# Patient Record
Sex: FEMALE | Race: WHITE | NOT HISPANIC OR LATINO | URBAN - METROPOLITAN AREA
[De-identification: names, ages, dates, MRNs, and addresses within clinical notes are randomized per-mention and may not be internally consistent; named-entity substitution may affect disease eponyms.]

---

## 2017-01-26 ENCOUNTER — COMMUNICATION - HEALTHEAST (OUTPATIENT)
Dept: FAMILY MEDICINE | Facility: CLINIC | Age: 57
End: 2017-01-26

## 2017-01-26 DIAGNOSIS — G43.909 MIGRAINE: ICD-10-CM

## 2017-04-28 ENCOUNTER — COMMUNICATION - HEALTHEAST (OUTPATIENT)
Dept: FAMILY MEDICINE | Facility: CLINIC | Age: 57
End: 2017-04-28

## 2017-04-28 ENCOUNTER — OFFICE VISIT - HEALTHEAST (OUTPATIENT)
Dept: FAMILY MEDICINE | Facility: CLINIC | Age: 57
End: 2017-04-28

## 2017-04-28 DIAGNOSIS — J02.9 SORE THROAT: ICD-10-CM

## 2017-04-28 DIAGNOSIS — E78.00 HYPERCHOLESTEROLEMIA: ICD-10-CM

## 2017-04-28 DIAGNOSIS — Z00.00 ROUTINE GENERAL MEDICAL EXAMINATION AT A HEALTH CARE FACILITY: ICD-10-CM

## 2017-04-28 DIAGNOSIS — I10 HTN (HYPERTENSION): ICD-10-CM

## 2017-04-28 DIAGNOSIS — I10 ESSENTIAL HYPERTENSION: ICD-10-CM

## 2017-04-28 LAB
ALT SERPL W P-5'-P-CCNC: 18 U/L (ref 0–45)
CHOLEST SERPL-MCNC: 191 MG/DL
FASTING STATUS PATIENT QL REPORTED: YES
HDLC SERPL-MCNC: 55 MG/DL
LDLC SERPL CALC-MCNC: 120 MG/DL
TRIGL SERPL-MCNC: 78 MG/DL

## 2017-04-28 ASSESSMENT — MIFFLIN-ST. JEOR: SCORE: 1302.3

## 2018-04-26 ENCOUNTER — COMMUNICATION - HEALTHEAST (OUTPATIENT)
Dept: FAMILY MEDICINE | Facility: CLINIC | Age: 58
End: 2018-04-26

## 2018-04-26 DIAGNOSIS — I10 HTN (HYPERTENSION): ICD-10-CM

## 2018-07-27 ENCOUNTER — COMMUNICATION - HEALTHEAST (OUTPATIENT)
Dept: FAMILY MEDICINE | Facility: CLINIC | Age: 58
End: 2018-07-27

## 2018-07-27 DIAGNOSIS — I10 HTN (HYPERTENSION): ICD-10-CM

## 2018-08-12 ENCOUNTER — COMMUNICATION - HEALTHEAST (OUTPATIENT)
Dept: FAMILY MEDICINE | Facility: CLINIC | Age: 58
End: 2018-08-12

## 2018-08-12 DIAGNOSIS — I10 HTN (HYPERTENSION): ICD-10-CM

## 2018-08-23 ENCOUNTER — COMMUNICATION - HEALTHEAST (OUTPATIENT)
Dept: FAMILY MEDICINE | Facility: CLINIC | Age: 58
End: 2018-08-23

## 2018-08-23 DIAGNOSIS — I10 HTN (HYPERTENSION): ICD-10-CM

## 2018-08-29 ENCOUNTER — RECORDS - HEALTHEAST (OUTPATIENT)
Dept: MAMMOGRAPHY | Facility: CLINIC | Age: 58
End: 2018-08-29

## 2018-08-29 ENCOUNTER — OFFICE VISIT - HEALTHEAST (OUTPATIENT)
Dept: FAMILY MEDICINE | Facility: CLINIC | Age: 58
End: 2018-08-29

## 2018-08-29 DIAGNOSIS — Z00.00 ROUTINE GENERAL MEDICAL EXAMINATION AT A HEALTH CARE FACILITY: ICD-10-CM

## 2018-08-29 DIAGNOSIS — F17.200 TOBACCO USE DISORDER: ICD-10-CM

## 2018-08-29 DIAGNOSIS — Z00.00 HEALTHCARE MAINTENANCE: ICD-10-CM

## 2018-08-29 DIAGNOSIS — G43.909 MIGRAINE: ICD-10-CM

## 2018-08-29 DIAGNOSIS — Z12.31 ENCOUNTER FOR SCREENING MAMMOGRAM FOR MALIGNANT NEOPLASM OF BREAST: ICD-10-CM

## 2018-08-29 DIAGNOSIS — I10 ESSENTIAL HYPERTENSION: ICD-10-CM

## 2018-08-29 DIAGNOSIS — E78.00 HYPERCHOLESTEROLEMIA: ICD-10-CM

## 2018-08-29 LAB
ALT SERPL W P-5'-P-CCNC: 18 U/L (ref 0–45)
ANION GAP SERPL CALCULATED.3IONS-SCNC: 10 MMOL/L (ref 5–18)
BUN SERPL-MCNC: 17 MG/DL (ref 8–22)
CALCIUM SERPL-MCNC: 9.6 MG/DL (ref 8.5–10.5)
CHLORIDE BLD-SCNC: 108 MMOL/L (ref 98–107)
CHOLEST SERPL-MCNC: 235 MG/DL
CO2 SERPL-SCNC: 25 MMOL/L (ref 22–31)
CREAT SERPL-MCNC: 0.68 MG/DL (ref 0.6–1.1)
FASTING STATUS PATIENT QL REPORTED: YES
GFR SERPL CREATININE-BSD FRML MDRD: >60 ML/MIN/1.73M2
GLUCOSE BLD-MCNC: 91 MG/DL (ref 70–125)
HDLC SERPL-MCNC: 59 MG/DL
LDLC SERPL CALC-MCNC: 161 MG/DL
POTASSIUM BLD-SCNC: 4.1 MMOL/L (ref 3.5–5)
SODIUM SERPL-SCNC: 143 MMOL/L (ref 136–145)
TRIGL SERPL-MCNC: 77 MG/DL

## 2018-08-29 ASSESSMENT — MIFFLIN-ST. JEOR: SCORE: 1281.89

## 2018-08-29 NOTE — ASSESSMENT & PLAN NOTE
Healthcare maintenance assessment  Immunization-up-to-date, flu shot when available  Cancer [id number]-date colon cancer screening and Pap smear, mammography due which was ordered today  Vascular-as above, encouraged to quit smoking  Other -none

## 2018-08-29 NOTE — ASSESSMENT & PLAN NOTE
Hypertension is borderline controlled  BP Readings from Last 3 Encounters:   08/29/18 142/74   04/28/17 136/72   12/10/15 100/70     Comment: Patient's blood pressure numbers, every 2 weeks in the pharmacy, are better.  Current antihypertensives propranolol 40 mg twice daily  Plan: Continue this medication, start checking blood pressures occasionally as nurse only visits in our clinic  Follow-up: Depending on blood pressure, check BMP

## 2018-08-30 ENCOUNTER — COMMUNICATION - HEALTHEAST (OUTPATIENT)
Dept: FAMILY MEDICINE | Facility: CLINIC | Age: 58
End: 2018-08-30

## 2018-08-31 ENCOUNTER — AMBULATORY - HEALTHEAST (OUTPATIENT)
Dept: FAMILY MEDICINE | Facility: CLINIC | Age: 58
End: 2018-08-31

## 2018-08-31 DIAGNOSIS — E78.5 HYPERLIPIDEMIA LDL GOAL <100: ICD-10-CM

## 2018-10-23 ENCOUNTER — RECORDS - HEALTHEAST (OUTPATIENT)
Dept: ADMINISTRATIVE | Facility: OTHER | Age: 58
End: 2018-10-23

## 2019-05-29 ENCOUNTER — COMMUNICATION - HEALTHEAST (OUTPATIENT)
Dept: FAMILY MEDICINE | Facility: CLINIC | Age: 59
End: 2019-05-29

## 2019-06-13 ENCOUNTER — COMMUNICATION - HEALTHEAST (OUTPATIENT)
Dept: FAMILY MEDICINE | Facility: CLINIC | Age: 59
End: 2019-06-13

## 2019-06-13 DIAGNOSIS — I10 ESSENTIAL HYPERTENSION: ICD-10-CM

## 2019-09-15 ENCOUNTER — COMMUNICATION - HEALTHEAST (OUTPATIENT)
Dept: FAMILY MEDICINE | Facility: CLINIC | Age: 59
End: 2019-09-15

## 2019-09-15 DIAGNOSIS — I10 ESSENTIAL HYPERTENSION: ICD-10-CM

## 2019-11-16 ENCOUNTER — COMMUNICATION - HEALTHEAST (OUTPATIENT)
Dept: FAMILY MEDICINE | Facility: CLINIC | Age: 59
End: 2019-11-16

## 2019-11-16 DIAGNOSIS — I10 ESSENTIAL HYPERTENSION: ICD-10-CM

## 2020-01-09 ENCOUNTER — COMMUNICATION - HEALTHEAST (OUTPATIENT)
Dept: FAMILY MEDICINE | Facility: CLINIC | Age: 60
End: 2020-01-09

## 2020-01-09 DIAGNOSIS — I10 ESSENTIAL HYPERTENSION: ICD-10-CM

## 2020-01-29 ENCOUNTER — OFFICE VISIT - HEALTHEAST (OUTPATIENT)
Dept: FAMILY MEDICINE | Facility: CLINIC | Age: 60
End: 2020-01-29

## 2020-01-29 DIAGNOSIS — I10 ESSENTIAL HYPERTENSION: ICD-10-CM

## 2020-01-29 DIAGNOSIS — E78.00 HYPERCHOLESTEROLEMIA: ICD-10-CM

## 2020-01-29 DIAGNOSIS — F41.9 ANXIETY: ICD-10-CM

## 2020-01-29 DIAGNOSIS — Z00.00 HEALTHCARE MAINTENANCE: ICD-10-CM

## 2020-01-29 DIAGNOSIS — G43.909 MIGRAINE WITHOUT STATUS MIGRAINOSUS, NOT INTRACTABLE, UNSPECIFIED MIGRAINE TYPE: ICD-10-CM

## 2020-01-29 LAB
ALT SERPL W P-5'-P-CCNC: 32 U/L (ref 0–45)
ANION GAP SERPL CALCULATED.3IONS-SCNC: 12 MMOL/L (ref 5–18)
BUN SERPL-MCNC: 15 MG/DL (ref 8–22)
CALCIUM SERPL-MCNC: 9.5 MG/DL (ref 8.5–10.5)
CHLORIDE BLD-SCNC: 105 MMOL/L (ref 98–107)
CHOLEST SERPL-MCNC: 170 MG/DL
CO2 SERPL-SCNC: 24 MMOL/L (ref 22–31)
CREAT SERPL-MCNC: 0.77 MG/DL (ref 0.6–1.1)
FASTING STATUS PATIENT QL REPORTED: YES
GFR SERPL CREATININE-BSD FRML MDRD: >60 ML/MIN/1.73M2
GLUCOSE BLD-MCNC: 115 MG/DL (ref 70–125)
HDLC SERPL-MCNC: 60 MG/DL
LDLC SERPL CALC-MCNC: 89 MG/DL
POTASSIUM BLD-SCNC: 4.5 MMOL/L (ref 3.5–5)
SODIUM SERPL-SCNC: 141 MMOL/L (ref 136–145)
TRIGL SERPL-MCNC: 106 MG/DL

## 2020-01-29 RX ORDER — LORAZEPAM 0.5 MG/1
TABLET ORAL
Qty: 20 TABLET | Refills: 0 | Status: SHIPPED | OUTPATIENT
Start: 2020-01-29

## 2020-01-29 ASSESSMENT — MIFFLIN-ST. JEOR: SCORE: 1330.65

## 2020-01-29 NOTE — ASSESSMENT & PLAN NOTE
Patient quit smoking which drastically drops predicted risk of cardiac ischemia, now 3% based on today's numbers.  Patient with strong family history of heart disease-wishes to continue for now  Check lipid and ALT

## 2020-01-29 NOTE — ASSESSMENT & PLAN NOTE
Blood pressure continues to be well controlled on propranolol 40 mg twice daily alone.  Dual use of this medication, with migraine prophylaxis.  Previously on lisinopril which she no longer requires.  Continues to check blood pressures regularly, generally under good control.  No changes, check BMP.

## 2020-01-29 NOTE — ASSESSMENT & PLAN NOTE
Healthcare maintenance assessment  Immunization-up-to-date  Cancer screening-mammogram every 2 years, due in this next year, Pap smear every 5 if normal, due December, wished to wait on this.  Vascular-see discussion of hyperlipidemia  Other-encourage exercise

## 2020-01-30 ENCOUNTER — COMMUNICATION - HEALTHEAST (OUTPATIENT)
Dept: FAMILY MEDICINE | Facility: CLINIC | Age: 60
End: 2020-01-30

## 2020-08-08 ENCOUNTER — COMMUNICATION - HEALTHEAST (OUTPATIENT)
Dept: FAMILY MEDICINE | Facility: CLINIC | Age: 60
End: 2020-08-08

## 2020-08-08 DIAGNOSIS — I10 ESSENTIAL HYPERTENSION: ICD-10-CM

## 2020-11-02 ENCOUNTER — COMMUNICATION - HEALTHEAST (OUTPATIENT)
Dept: FAMILY MEDICINE | Facility: CLINIC | Age: 60
End: 2020-11-02

## 2020-11-02 DIAGNOSIS — I10 ESSENTIAL HYPERTENSION: ICD-10-CM

## 2021-01-09 ENCOUNTER — COMMUNICATION - HEALTHEAST (OUTPATIENT)
Dept: FAMILY MEDICINE | Facility: CLINIC | Age: 61
End: 2021-01-09

## 2021-01-09 ENCOUNTER — COMMUNICATION - HEALTHEAST (OUTPATIENT)
Dept: SCHEDULING | Facility: CLINIC | Age: 61
End: 2021-01-09

## 2021-01-09 DIAGNOSIS — I10 ESSENTIAL HYPERTENSION: ICD-10-CM

## 2021-02-09 ENCOUNTER — COMMUNICATION - HEALTHEAST (OUTPATIENT)
Dept: FAMILY MEDICINE | Facility: CLINIC | Age: 61
End: 2021-02-09

## 2021-02-09 DIAGNOSIS — I10 ESSENTIAL HYPERTENSION: ICD-10-CM

## 2021-02-13 ENCOUNTER — COMMUNICATION - HEALTHEAST (OUTPATIENT)
Dept: FAMILY MEDICINE | Facility: CLINIC | Age: 61
End: 2021-02-13

## 2021-02-13 DIAGNOSIS — I10 ESSENTIAL HYPERTENSION: ICD-10-CM

## 2021-02-16 RX ORDER — PROPRANOLOL HYDROCHLORIDE 40 MG/1
40 TABLET ORAL 3 TIMES DAILY
Qty: 90 TABLET | Refills: 5 | Status: SHIPPED | OUTPATIENT
Start: 2021-02-16 | End: 2021-08-17

## 2021-05-18 ENCOUNTER — COMMUNICATION - HEALTHEAST (OUTPATIENT)
Dept: FAMILY MEDICINE | Facility: CLINIC | Age: 61
End: 2021-05-18

## 2021-05-18 DIAGNOSIS — I10 ESSENTIAL HYPERTENSION: ICD-10-CM

## 2021-05-19 RX ORDER — ATORVASTATIN CALCIUM 10 MG/1
TABLET, FILM COATED ORAL
Qty: 90 TABLET | Refills: 0 | Status: SHIPPED | OUTPATIENT
Start: 2021-05-19 | End: 2021-08-13

## 2021-05-29 NOTE — TELEPHONE ENCOUNTER
Called patient to schedule appt with PCP for BP check, patient declined at the moment, will have to check her schedule and will call back to schedule appt. Postponing out 1 week and will try again if appt has not been made.

## 2021-05-29 NOTE — TELEPHONE ENCOUNTER
Refill Approved    Rx renewed per Medication Renewal Policy. Medication was last renewed on 8/29/18.    Sharmaine Romero, [facility] Connection Triage/Med Refill 6/13/2019     Requested Prescriptions   Pending Prescriptions Disp Refills     propranolol (INDERAL) 40 MG tablet [Pharmacy Med Name: Propranolol HCl Oral Tablet 40 MG] 90 tablet 2     Sig: TAKE 1 TABLET (40 MG TOTAL) BY MOUTH 3 (THREE) TIMES A DAY       Beta-Blockers Refill Protocol Passed - 6/13/2019  7:00 AM        Passed - PCP or prescribing provider visit in past 12 months or next 3 months     Last office visit with prescriber/PCP: Visit date not found OR same dept: Visit date not found OR same specialty: Visit date not found  Last physical: 8/29/2018 Last MTM visit: Visit date not found   Next visit within 3 mo: Visit date not found  Next physical within 3 mo: Visit date not found  Prescriber OR PCP: Wale Lima MD  Last diagnosis associated with med order: There are no diagnoses linked to this encounter.  If protocol passes may refill for 12 months if within 3 months of last provider visit (or a total of 15 months).             Passed - Blood pressure filed in past 12 months     BP Readings from Last 1 Encounters:   08/29/18 142/74

## 2021-05-30 VITALS — HEIGHT: 65 IN | WEIGHT: 163 LBS | BODY MASS INDEX: 27.16 KG/M2

## 2021-06-01 NOTE — TELEPHONE ENCOUNTER
RN cannot approve Refill Request    RN can NOT refill this medication PCP messaged that patient is overdue for Office Visit. Last office visit: Visit date not found Last Physical: 8/29/2018 Last MTM visit: Visit date not found Last visit same specialty: Visit date not found.  Next visit within 3 mo: Visit date not found  Next physical within 3 mo: Visit date not found      Vinicius Hatch, [facility] Connection Triage/Med Refill 9/15/2019    Requested Prescriptions   Pending Prescriptions Disp Refills     atorvastatin (LIPITOR) 10 MG tablet [Pharmacy Med Name: Atorvastatin Calcium Oral Tablet 10 MG] 30 tablet 10     Sig: Take 1 tablet (10 mg total) by mouth daily       Statins Refill Protocol (Hmg CoA Reductase Inhibitors) Failed - 9/15/2019  7:00 AM        Failed - PCP or prescribing provider visit in past 12 months      Last office visit with prescriber/PCP: Visit date not found OR same dept: Visit date not found OR same specialty: Visit date not found  Last physical: 8/29/2018 Last MTM visit: Visit date not found   Next visit within 3 mo: Visit date not found  Next physical within 3 mo: Visit date not found  Prescriber OR PCP: Wale Lima MD  Last diagnosis associated with med order: 1. Essential hypertension  - propranolol (INDERAL) 40 MG tablet [Pharmacy Med Name: Propranolol HCl Oral Tablet 40 MG]; TAKE 1 TABLET (40 MG TOTAL) BY MOUTH 3 (THREE) TIMES A DAY  Dispense: 90 tablet; Refill: 1    If protocol passes may refill for 12 months if within 3 months of last provider visit (or a total of 15 months).             propranolol (INDERAL) 40 MG tablet [Pharmacy Med Name: Propranolol HCl Oral Tablet 40 MG] 90 tablet 1     Sig: TAKE 1 TABLET (40 MG TOTAL) BY MOUTH 3 (THREE) TIMES A DAY       Beta-Blockers Refill Protocol Failed - 9/15/2019  7:00 AM        Failed - PCP or prescribing provider visit in past 12 months or next 3 months     Last office visit with prescriber/PCP: Visit date not found OR same dept: Visit  date not found OR same specialty: Visit date not found  Last physical: 8/29/2018 Last MTM visit: Visit date not found   Next visit within 3 mo: Visit date not found  Next physical within 3 mo: Visit date not found  Prescriber OR PCP: Wale Lima MD  Last diagnosis associated with med order: 1. Essential hypertension  - propranolol (INDERAL) 40 MG tablet [Pharmacy Med Name: Propranolol HCl Oral Tablet 40 MG]; TAKE 1 TABLET (40 MG TOTAL) BY MOUTH 3 (THREE) TIMES A DAY  Dispense: 90 tablet; Refill: 1    If protocol passes may refill for 12 months if within 3 months of last provider visit (or a total of 15 months).             Failed - Blood pressure filed in past 12 months     BP Readings from Last 1 Encounters:   08/29/18 142/74

## 2021-06-02 VITALS — WEIGHT: 158.5 LBS | BODY MASS INDEX: 26.41 KG/M2 | HEIGHT: 65 IN

## 2021-06-03 NOTE — TELEPHONE ENCOUNTER
RN cannot approve Refill Request    RN can NOT refill this medication Protocol failed and NO refill given.         Sharmaine Romero, [facility] Triage/Med Refill 11/17/2019    Requested Prescriptions   Pending Prescriptions Disp Refills     propranolol (INDERAL) 40 MG tablet [Pharmacy Med Name: Propranolol HCl Oral Tablet 40 MG] 90 tablet 0     Sig: TAKE 1 TABLET (40 MG TOTAL) BY MOUTH 3 (THREE) TIMES A DAY       Beta-Blockers Refill Protocol Failed - 11/16/2019  7:00 AM        Failed - PCP or prescribing provider visit in past 12 months or next 3 months     Last office visit with prescriber/PCP: Visit date not found OR same dept: Visit date not found OR same specialty: Visit date not found  Last physical: 8/29/2018 Last MTM visit: Visit date not found   Next visit within 3 mo: Visit date not found  Next physical within 3 mo: Visit date not found  Prescriber OR PCP: Wale Lima MD  Last diagnosis associated with med order: 1. Essential hypertension  - propranolol (INDERAL) 40 MG tablet [Pharmacy Med Name: Propranolol HCl Oral Tablet 40 MG]; TAKE 1 TABLET (40 MG TOTAL) BY MOUTH 3 (THREE) TIMES A DAY   Dispense: 90 tablet; Refill: 0    If protocol passes may refill for 12 months if within 3 months of last provider visit (or a total of 15 months).             Failed - Blood pressure filed in past 12 months     BP Readings from Last 1 Encounters:   08/29/18 142/74

## 2021-06-04 VITALS
RESPIRATION RATE: 16 BRPM | SYSTOLIC BLOOD PRESSURE: 136 MMHG | TEMPERATURE: 98 F | HEIGHT: 64 IN | HEART RATE: 65 BPM | WEIGHT: 171 LBS | DIASTOLIC BLOOD PRESSURE: 87 MMHG | BODY MASS INDEX: 29.19 KG/M2

## 2021-06-05 NOTE — TELEPHONE ENCOUNTER
Refill Request  Did you contact pharmacy: No  Medication name:   Requested Prescriptions     Pending Prescriptions Disp Refills     propranolol (INDERAL) 40 MG tablet 90 tablet 0     Sig: Take 1 tablet (40 mg total) by mouth 3 (three) times a day.

## 2021-06-05 NOTE — TELEPHONE ENCOUNTER
Refill NOT Given  Refill NOT Given> 15 months since last office visit  Refill given per Policy, patient informed they are overdue for Office Visit   OV 8/29/18    Alissa Rowan, [facility] Triage/Med Refill 1/10/2020    Requested Prescriptions   Pending Prescriptions Disp Refills     propranolol (INDERAL) 40 MG tablet 90 tablet 0     Sig: Take 1 tablet (40 mg total) by mouth 3 (three) times a day.       Beta-Blockers Refill Protocol Failed - 1/9/2020 10:39 AM        Failed - PCP or prescribing provider visit in past 12 months or next 3 months     Last office visit with prescriber/PCP: Visit date not found OR same dept: Visit date not found OR same specialty: Visit date not found  Last physical: 8/29/2018 Last MTM visit: Visit date not found   Next visit within 3 mo: Visit date not found  Next physical within 3 mo: Visit date not found  Prescriber OR PCP: Wale Lima MD  Last diagnosis associated with med order: 1. Essential hypertension  - propranolol (INDERAL) 40 MG tablet; Take 1 tablet (40 mg total) by mouth 3 (three) times a day.  Dispense: 90 tablet; Refill: 0    If protocol passes may refill for 12 months if within 3 months of last provider visit (or a total of 15 months).             Failed - Blood pressure filed in past 12 months     BP Readings from Last 1 Encounters:   08/29/18 142/74

## 2021-06-05 NOTE — PROGRESS NOTES
Adult Female Physical  A/P    Problem List Items Addressed This Visit        Unprioritized    Hypercholesterolemia     Patient quit smoking which drastically drops predicted risk of cardiac ischemia, now 3% based on today's numbers.  Patient with strong family history of heart disease-wishes to continue for now  Check lipid and ALT         Relevant Orders    ALT (SGPT)    Lipid Cascade    Migraine Headache     Well prophylaxed with propranolol         Essential Hypertension - Primary     Blood pressure continues to be well controlled on propranolol 40 mg twice daily alone.  Dual use of this medication, with migraine prophylaxis.  Previously on lisinopril which she no longer requires.  Continues to check blood pressures regularly, generally under good control.  No changes, check BMP.         Relevant Orders    Basic Metabolic Panel    Healthcare maintenance     Healthcare maintenance assessment  Immunization-up-to-date  Cancer screening-mammogram every 2 years, due in this next year, Pap smear every 5 if normal, due December, wished to wait on this.  Vascular-see discussion of hyperlipidemia  Other-encourage exercise             Anxiety     New Lorazepam, refills through the pharmacy, contact us directly or request in person         Relevant Medications    LORazepam (ATIVAN) 0.5 MG tablet              HPI  Current Concerns/ Questions  Oly is a 59-year-old with history of essential hypertension, hypercholesterolemia, nicotine dependence, and migraine headaches.  She currently takes 10 mg of atorvastatin, propranolol 40 mg 3 times a day for a combination of blood pressure control and migraine prophylaxis.  She is up-to-date on colonoscopy having had one in 2014.  Up-to-date on Pap smear having done 1 in December 2015 which was normal with normal HPV.  She will be due for this in December of this year.  10-year risk calculated in August 2018 for MI was 11%.  Recommended statin medication on this basis.  Would have  gone less than 10% which should be able to quit smoking.  Mammogram normal 8/29/2018.  Reviewed colonoscopy from 2014, single hyperplastic polyp, follow-up 10 years.    Discussing with patient today, no new concerns.  Requesting refill on Lorazepam which she uses very infrequently for anxiety.    Reports happily that she quit smoking, skin gained significant amount of weight with this.    Checks blood pressures regularly, generally in the 120s, 130s over 70s and 80s  PFSH:  Current medications reviewed as follows:  Current Outpatient Medications on File Prior to Visit   Medication Sig     atorvastatin (LIPITOR) 10 MG tablet Take 1 tablet (10 mg total) by mouth daily     propranolol (INDERAL) 40 MG tablet Take 1 tablet (40 mg total) by mouth 3 (three) times a day.     [DISCONTINUED] LORazepam (ATIVAN) 0.5 MG tablet 1-2 tabs po q 6 hours prn     No current facility-administered medications on file prior to visit.      Patient Active Problem List   Diagnosis     Hypercholesterolemia     Migraine Headache     Essential Hypertension     Healthcare maintenance     Anxiety     No past medical history on file.  No past surgical history on file.  Family History   Problem Relation Age of Onset     Stroke Mother 67     Rheumatic fever Father 47     Hypertension Brother      Psoriasis Brother      Social History     Tobacco Use   Smoking Status Current Every Day Smoker     Packs/day: 0.50     Types: Cigarettes   Smokeless Tobacco Never Used       Social History     Social History Narrative     2018, working, family lives in [facility]     Immunization History   Administered Date(s) Administered     DT (pediatric) 02/04/2005     Influenza, inj, historic,unspecified 10/22/2013, 10/21/2019     Pneumo Polysac 23-V 10/09/2013     Td,adult,historic,unspecified 04/27/2009     Tdap 04/27/2009, 10/21/2019     ZOSTER, RECOMBINANT, IM 10/21/2019, 12/29/2019     Body mass index is 29.35 kg/m .    ROS  Full 10 system review  "including constitutional, respiratory, cardiac, gi, urinary, rheumatologic, neurologic, reproductive, dermatologic psychiatric is  performed  and is otherwise negative       Objective  Physical Exam  Vitals:    01/29/20 0808 01/29/20 0843   BP: 150/84 136/87   Patient Site: Right Arm Right Arm   Patient Position: Sitting Sitting   Cuff Size: Adult Large Adult Large   Pulse: 65    Resp: 16    Temp: 98  F (36.7  C)    TempSrc: Oral    Weight: 171 lb (77.6 kg)    Height: 5' 4\" (1.626 m)      Pleasant, gained some weight  Gen- alert and oriented x3, no acute distress  HEENT- Normocephalic atraumatic   pupils equal and reactive, EOMI.    TMs visualized and normal, ear canals normal.    Mouth moist with normal mucosa no ulceration, dentition normal or in good repair.  Neck- supple, no adenopathy or thyromegaly  Chest- Normal chest wall apperance, normal inspiration and expiration.  Clear to asculation.    CV- Regular rate and rhythm, normal tones, no murmus, gallops or rubs.  Abd-  Soft, nodistended, nontender.  Normal bowel sounds, no mass or organ enlargement.    Ext- Atraumatic,  No synovial thickening. Good perfusion, no edema. Periph pulses detected  Skin- warm and dry, no rash  Neuro- Cranial nerves grossly intact.  Normal gait, normal strength. Coordination intact.    Diagnostics  Pending    "

## 2021-06-05 NOTE — TELEPHONE ENCOUNTER
Refill NOT Given  Refill NOT Given> 15 months since last office visit  Refill given per Policy, patient informed they are overdue for Office Visit   OV 8/29/18    Alissa Rowan, [facility] Triage/Med Refill 1/10/2020    Requested Prescriptions   Pending Prescriptions Disp Refills     propranolol (INDERAL) 40 MG tablet [Pharmacy Med Name: Propranolol HCl Oral Tablet 40 MG] 90 tablet 0     Sig: TAKE 1 TABLET (40 MG TOTAL) BY MOUTH 3 (THREE) TIMES A DAY       Beta-Blockers Refill Protocol Failed - 1/9/2020  6:13 PM        Failed - PCP or prescribing provider visit in past 12 months or next 3 months     Last office visit with prescriber/PCP: Visit date not found OR same dept: Visit date not found OR same specialty: Visit date not found  Last physical: Visit date not found Last MTM visit: Visit date not found   Next visit within 3 mo: Visit date not found  Next physical within 3 mo: Visit date not found  Prescriber OR PCP: Luis Miguel Guadarrama MD  Last diagnosis associated with med order: 1. Essential hypertension  - propranolol (INDERAL) 40 MG tablet [Pharmacy Med Name: Propranolol HCl Oral Tablet 40 MG]; TAKE 1 TABLET (40 MG TOTAL) BY MOUTH 3 (THREE) TIMES A DAY  Dispense: 90 tablet; Refill: 0    If protocol passes may refill for 12 months if within 3 months of last provider visit (or a total of 15 months).             Failed - Blood pressure filed in past 12 months     BP Readings from Last 1 Encounters:   08/29/18 142/74

## 2021-06-10 NOTE — PROGRESS NOTES
Adult Female Physical  A/P  1. Routine general medical examination at a health care facility  Due for mammogram, declines this at this time.  Up-to-date, Pap smear, due in 2020  Colonoscopy done in 2014  2. Sore throat  Viral URI, reassured, symptomatic treatment  - Rapid Strep A Screen-Throat  - Group A Strep, RNA Direct Detection, Throat    3. Essential hypertension  Well-controlled on propranolol.  Uses this for blood pressure and migraine prophylaxis and it is effective.  Only one major migraine in 2 or 3 minor migraines every year  - Lipid Cascade  - Basic Metabolic Panel  - ALT (SGPT)    4. Hypercholesterolemia  Recheck labs.  - Lipid Cascade  - ALT (SGPT)  5.  Nicotine dependence.  Patient not contemplating quitting at this time.   has terminal condition, under a lot of stress.  Smoking a total of a pack a week      HPI  Current Concerns/ Questions  Uri x 1 day- tested for strep    Taking care of - still with stress  PFSH:  Current medications reviewed as follows:  Current Outpatient Prescriptions on File Prior to Visit   Medication Sig     LORazepam (ATIVAN) 0.5 MG tablet 1-2 tabs po q 6 hours prn     propranolol (INDERAL) 40 MG tablet TAKE ONE TABLET BY MOUTH TWICE DAILY      lisinopril (PRINIVIL,ZESTRIL) 10 MG tablet Take 1 tablet (10 mg total) by mouth daily.     propranolol (INDERAL) 20 MG tablet TAKE 2 TABLETS (40 MG TOTAL) BY MOUTH 2 (TWO) TIMES A DAY.     No current facility-administered medications on file prior to visit.      Patient Active Problem List   Diagnosis     Hyperlipidemia     Nicotine Dependence     Migraine Headache     Essential Hypertension     No past medical history on file.  No past surgical history on file.  No family history on file.  History   Smoking Status     Current Every Day Smoker     Packs/day: 0.50     Types: Cigarettes   Smokeless Tobacco     Not on file     Other Habits:  Alcohol/ Drug use?  No  Social History     Social History Narrative     Immunization  "History   Administered Date(s) Administered     DT (pediatric) 02/04/2005     Influenza, inj, historic 10/22/2013     Pneumo Polysac 23-V 10/09/2013     Td, historic 04/27/2009     Tdap 04/27/2009       Body mass index is 27.55 kg/(m^2).    ROS  Full 10 system review including constitutional, respiratory, cardiac, gi, urinary, rheumatologic, neurologic, reproductive, dermatologic psychiatric is  performed (via questionnaire) and is negative      Objective  Physical Exam  Vitals:    04/28/17 1024   BP: 136/72   Patient Site: Right Arm   Patient Position: Sitting   Cuff Size: Adult Regular   Pulse: 60   Resp: 20   Temp: 98.2  F (36.8  C)   TempSrc: Oral   Weight: 163 lb (73.9 kg)   Height: 5' 4.5\" (1.638 m)       Gen- alert and oriented x3, no acute distress  HEENT- Normocephalic atraumatic   pupils equal and reactive, EOMI.    TMs visualized and normal, ear canals normal.    Mouth moist with normal mucosa no ulceration, dentition normal or in good repair.  Neck- supple, no adenopathy or thyromegaly  Chest- Normal chest wall apperance, normal inspiration and expiration.  Clear to asculation.  Breast exam- normal, no dominant masses, skin changes.  No axillary adenopathy  CV- Regular rate and rhythm, normal tones, no murmus, gallops or rubs.  Abd-  Soft, nodistended, nontender.  Normal bowel sounds, no mass or organ enlargement.  Pelvic exam not done after discussion today  Ext- Atraumatic,  No synovial thickening. Good perfusion, no edema. Periph pulses detected  Skin- warm and dry, no rash  Neuro- Cranial nerves grossly intact.  Normal gait, normal strength.  Reflexes symmetric.  Coordination intact.    Diagnostics  Pending  "

## 2021-06-10 NOTE — TELEPHONE ENCOUNTER
Refill Approved    Rx renewed per Medication Renewal Policy. Medication was last renewed on 9/17/19.    Zaina Devlin, [facility] [place] Connection Triage/Med Refill 8/8/2020     Requested Prescriptions   Pending Prescriptions Disp Refills     atorvastatin (LIPITOR) 10 MG tablet [Pharmacy Med Name: Atorvastatin Calcium Oral Tablet 10 MG] 30 tablet 0     Sig: Take 1 tablet (10 mg total) by mouth daily       Statins Refill Protocol (Hmg CoA Reductase Inhibitors) Passed - 8/8/2020  2:00 AM        Passed - PCP or prescribing provider visit in past 12 months      Last office visit with prescriber/PCP: Visit date not found OR same dept: Visit date not found OR same specialty: Visit date not found  Last physical: 1/29/2020 Last MTM visit: Visit date not found   Next visit within 3 mo: Visit date not found  Next physical within 3 mo: Visit date not found  Prescriber OR PCP: Wale Lima MD  Last diagnosis associated with med order: 1. Essential hypertension  - atorvastatin (LIPITOR) 10 MG tablet [Pharmacy Med Name: Atorvastatin Calcium Oral Tablet 10 MG]; Take 1 tablet (10 mg total) by mouth daily  Dispense: 30 tablet; Refill: 0    If protocol passes may refill for 12 months if within 3 months of last provider visit (or a total of 15 months).

## 2021-06-14 NOTE — TELEPHONE ENCOUNTER
Refill Approved    Rx renewed per Medication Renewal Policy. Medication was last renewed on 1/10/2020  OV 1/29/2020  Valeria Allen, [facility] Connection Triage/Med Refill 1/9/2021     Requested Prescriptions   Pending Prescriptions Disp Refills     propranoloL (INDERAL) 40 MG tablet [Pharmacy Med Name: Propranolol HCl Oral Tablet 40 MG] 90 tablet 0     Sig: Take 1 tablet (40 mg total) by mouth 3 (three) times a day       Beta-Blockers Refill Protocol Passed - 1/9/2021  2:00 AM        Passed - PCP or prescribing provider visit in past 12 months or next 3 months     Last office visit with prescriber/PCP: Visit date not found OR same dept: Visit date not found OR same specialty: Visit date not found  Last physical: 1/29/2020 Last MTM visit: Visit date not found   Next visit within 3 mo: Visit date not found  Next physical within 3 mo: Visit date not found  Prescriber OR PCP: Wale Lima MD  Last diagnosis associated with med order: 1. Essential hypertension  - propranoloL (INDERAL) 40 MG tablet [Pharmacy Med Name: Propranolol HCl Oral Tablet 40 MG]; Take 1 tablet (40 mg total) by mouth 3 (three) times a day.  Dispense: 90 tablet; Refill: 0    If protocol passes may refill for 12 months if within 3 months of last provider visit (or a total of 15 months).             Passed - Blood pressure filed in past 12 months     BP Readings from Last 1 Encounters:   01/29/20 136/87

## 2021-06-15 NOTE — TELEPHONE ENCOUNTER
RN cannot approve Refill Request    RN can NOT refill this medication PCP messaged that patient is overdue for Office Visit and BP. Last office visit: Visit date not found Last Physical: 1/29/2020 Last MTM visit: Visit date not found Last visit same specialty: Visit date not found.  Next visit within 3 mo: Visit date not found  Next physical within 3 mo: Visit date not found      Mallorie Shultz, [facility] Triage/Med Refill 2/13/2021    Requested Prescriptions   Pending Prescriptions Disp Refills     propranoloL (INDERAL) 40 MG tablet [Pharmacy Med Name: Propranolol HCl Oral Tablet 40 MG] 90 tablet 0     Sig: Take 1 tablet (40 mg total) by mouth 3 (three) times a day       Beta-Blockers Refill Protocol Failed - 2/13/2021  3:10 PM        Failed - PCP or prescribing provider visit in past 12 months or next 3 months     Last office visit with prescriber/PCP: Visit date not found OR same dept: Visit date not found OR same specialty: Visit date not found  Last physical: 1/29/2020 Last MTM visit: Visit date not found   Next visit within 3 mo: Visit date not found  Next physical within 3 mo: Visit date not found  Prescriber OR PCP: Wale Lima MD  Last diagnosis associated with med order: 1. Essential hypertension  - propranoloL (INDERAL) 40 MG tablet [Pharmacy Med Name: Propranolol HCl Oral Tablet 40 MG]; Take 1 tablet (40 mg total) by mouth 3 (three) times a day  Dispense: 90 tablet; Refill: 0    If protocol passes may refill for 12 months if within 3 months of last provider visit (or a total of 15 months).             Failed - Blood pressure filed in past 12 months     BP Readings from Last 1 Encounters:   01/29/20 136/87

## 2021-06-15 NOTE — TELEPHONE ENCOUNTER
RN cannot approve Refill Request    RN can NOT refill this medication PCP messaged that patient is overdue for Office Visit and Protocol failed and NO refill given. Last office visit: Visit date not found Last Physical: Visit date not found Last MTM visit: Visit date not found Last visit same specialty: Visit date not found.  Next visit within 3 mo: Visit date not found  Next physical within 3 mo: Visit date not found      Maricruz Zafar, [facility] Triage/Med Refill 2/9/2021    Requested Prescriptions   Pending Prescriptions Disp Refills     atorvastatin (LIPITOR) 10 MG tablet [Pharmacy Med Name: Atorvastatin Calcium Oral Tablet 10 MG] 90 tablet 0     Sig: TAKE ONE TABLET BY MOUTH ONCE DAILY       Statins Refill Protocol (Hmg CoA Reductase Inhibitors) Failed - 2/9/2021  2:00 AM        Failed - PCP or prescribing provider visit in past 12 months      Last office visit with prescriber/PCP: Visit date not found OR same dept: Visit date not found OR same specialty: Visit date not found  Last physical: Visit date not found Last MTM visit: Visit date not found   Next visit within 3 mo: Visit date not found  Next physical within 3 mo: Visit date not found  Prescriber OR PCP: Miguelina Patton MD  Last diagnosis associated with med order: 1. Essential hypertension  - atorvastatin (LIPITOR) 10 MG tablet [Pharmacy Med Name: Atorvastatin Calcium Oral Tablet 10 MG]; TAKE ONE TABLET BY MOUTH ONCE DAILY  Dispense: 90 tablet; Refill: 0    If protocol passes may refill for 12 months if within 3 months of last provider visit (or a total of 15 months).

## 2021-06-16 PROBLEM — Z00.00 HEALTHCARE MAINTENANCE: Status: ACTIVE | Noted: 2018-08-29

## 2021-06-16 PROBLEM — F41.9 ANXIETY: Status: ACTIVE | Noted: 2020-01-29

## 2021-06-17 NOTE — TELEPHONE ENCOUNTER
RN cannot approve Refill Request    RN can NOT refill this medication PCP messaged that patient is overdue for Office Visit. Last office visit: Visit date not found Last Physical: 1/29/2020 Last MTM visit: Visit date not found Last visit same specialty: Visit date not found.  Next visit within 3 mo: Visit date not found  Next physical within 3 mo: Visit date not found      Naomie Starr, [facility] Triage/Med Refill 5/18/2021    Requested Prescriptions   Pending Prescriptions Disp Refills     atorvastatin (LIPITOR) 10 MG tablet [Pharmacy Med Name: Atorvastatin Calcium Oral Tablet 10 MG] 90 tablet 0     Sig: TAKE ONE TABLET BY MOUTH ONCE DAILY       Statins Refill Protocol (Hmg CoA Reductase Inhibitors) Failed - 5/18/2021  9:43 AM        Failed - PCP or prescribing provider visit in past 12 months      Last office visit with prescriber/PCP: Visit date not found OR same dept: Visit date not found OR same specialty: Visit date not found  Last physical: 1/29/2020 Last MTM visit: Visit date not found   Next visit within 3 mo: Visit date not found  Next physical within 3 mo: Visit date not found  Prescriber OR PCP: Wale Lima MD  Last diagnosis associated with med order: 1. Essential hypertension  - atorvastatin (LIPITOR) 10 MG tablet [Pharmacy Med Name: Atorvastatin Calcium Oral Tablet 10 MG]; TAKE ONE TABLET BY MOUTH ONCE DAILY  Dispense: 90 tablet; Refill: 0    If protocol passes may refill for 12 months if within 3 months of last provider visit (or a total of 15 months).

## 2021-06-20 NOTE — PROGRESS NOTES
Adult Female Physical  A/P    Problem List Items Addressed This Visit        Unprioritized    Hypercholesterolemia     Diet, no medications, fasting, check lipid         Relevant Orders    Lipid Cascade    ALT (SGPT)    Nicotine Dependence     Smoking cessation  Greater than 5 minutes spent discussing smoking cessation today.  Patient is in the contemplation - ambivalent about change regarding quitting.  Motivational interview technique used to explore patients desire to quit.  Patient is of the mind that when she is ready to quit she can do it.  She has in the past for 1 year at a time.  Currently smoking about a pack and a half per day             Migraine Headache     Very rare migraines, on propranolol, continue prophylaxis         Relevant Medications    propranolol (INDERAL) 40 MG tablet    Essential Hypertension     Hypertension is borderline controlled  BP Readings from Last 3 Encounters:   08/29/18 142/74   04/28/17 136/72   12/10/15 100/70     Comment: Patient's blood pressure numbers, every 2 weeks in the pharmacy, are better.  Current antihypertensives propranolol 40 mg twice daily  Plan: Continue this medication, start checking blood pressures occasionally as nurse only visits in our clinic  Follow-up: Depending on blood pressure, check BMP           Relevant Medications    propranolol (INDERAL) 40 MG tablet    Other Relevant Orders    Basic Metabolic Panel    Healthcare maintenance     Healthcare maintenance assessment  Immunization-up-to-date, flu shot when available  Cancer wvydvbtoc-mk-no-date colon cancer screening and Pap smear, mammography due which was ordered today  Vascular-as above, encouraged to quit smoking  Other -none             Relevant Orders    Mammo Screening Bilateral      Other Visit Diagnoses     Routine general medical examination at a health care facility    -  Primary            HPI  Current Concerns/ Questions  Oly comes in today for physical exam.  Has no major  complaints    Her   in the last year.  This is expected.  Long bout with myelodysplastic syndrome and COPD.  He was on hospice.    She continues to work, working a lot of overtime.  Has a child who lives in [facility] in a grand child is well.  She is looking forward to visiting them this fall.  Save money and over time and is looking forward to her trip.    History of hypertension.  On propranolol partly to prevent migraines which is been very effective and partly for blood pressure.  Ask her blood pressure about once every 2 weeks.  Generally, they have been running in the 120s over 80s.  She does not think she seen anything above 85 for some time.  This is done in the pharmacy on an automated machine    Continues to smoke cigarettes.  She is quit 2 times in the past, both for about a year.  Each time, she felt like she was just sick of smoking and stopped without any other help.  She feels like she is about at this point again.    Remains active.    PFSH:  Current medications reviewed as follows:  Current Outpatient Prescriptions on File Prior to Visit   Medication Sig     LORazepam (ATIVAN) 0.5 MG tablet 1-2 tabs po q 6 hours prn     [DISCONTINUED] propranolol (INDERAL) 20 MG tablet Take 2 tablets (40 mg total) by mouth 2 (two) times a day.     [DISCONTINUED] lisinopril (PRINIVIL,ZESTRIL) 10 MG tablet Take 1 tablet (10 mg total) by mouth daily.     [DISCONTINUED] propranolol (INDERAL) 40 MG tablet TAKE ONE TABLET BY MOUTH TWICE DAILY      No current facility-administered medications on file prior to visit.      Patient Active Problem List   Diagnosis     Hypercholesterolemia     Nicotine Dependence     Migraine Headache     Essential Hypertension     Healthcare maintenance     No past medical history on file.  No past surgical history on file.  Family History   Problem Relation Age of Onset     Stroke Mother 67     Rheumatic fever Father 47     Hypertension Brother      Psoriasis Brother   "    History   Smoking Status     Current Every Day Smoker     Packs/day: 0.50     Types: Cigarettes   Smokeless Tobacco     Never Used     Other Habits:  Alcohol/ Drug use?  Social alcohol  Social History     Social History Narrative     2018, working, family lives in [facility]     Immunization History   Administered Date(s) Administered     DT (pediatric) 02/04/2005     Influenza, inj, historic,unspecified 10/22/2013     Pneumo Polysac 23-V 10/09/2013     Td,adult,historic,unspecified 04/27/2009     Tdap 04/27/2009     HealthCare Maintance    Body mass index is 26.79 kg/(m^2).    ROS  Full 10 system review including constitutional, respiratory, cardiac, gi, urinary, rheumatologic, neurologic, reproductive, dermatologic psychiatric is  performed (via questionnaire) and is negative       Objective  Physical Exam  Vitals:    08/29/18 1353   BP: 142/74   Patient Site: Left Arm   Patient Position: Sitting   Cuff Size: Adult Regular   Pulse: (!) 56   Resp: 16   Temp: 97.7  F (36.5  C)   TempSrc: Oral   Weight: 158 lb 8 oz (71.9 kg)   Height: 5' 4.5\" (1.638 m)       Gen- alert and oriented x3, no acute distress  HEENT- Normocephalic atraumatic   pupils equal and reactive, EOMI.    TMs visualized and normal, ear canals normal.    Mouth moist with normal mucosa no ulceration, dentition normal or in good repair.  Neck- supple, no adenopathy or thyromegaly  Chest- Normal chest wall apperance, normal inspiration and expiration.  Clear to asculation.  Breast exam- normal, no dominant masses, skin changes.  No axillary adenopathy  CV- Regular rate and rhythm, normal tones, no murmus, gallops or rubs.  Abd-  Soft, nodistended, nontender.  Normal bowel sounds, no mass or organ enlargement.  Genitalia not done today  Ext- Atraumatic,  No synovial thickening. Good perfusion, no edema. Periph pulses detected  Skin- warm and dry, no rash  Neuro- Cranial nerves grossly intact.  Normal gait, normal strength.  Reflexes " symmetric.  Coordination intact.    Diagnostics  Pending

## 2021-06-20 NOTE — LETTER
Letter by Wale Lima MD at      Author: Wale Lima MD Service: -- Author Type: --    Filed:  Encounter Date: 1/30/2020 Status: (Other)         Oly Yusuf  [address]  [place] [address]             January 30, 2020         Dear Ms. Yusuf,    Below are the results from your recent visit:    Resulted Orders   Basic Metabolic Panel   Result Value Ref Range    Sodium 141 136 - 145 mmol/L    Potassium 4.5 3.5 - 5.0 mmol/L    Chloride 105 98 - 107 mmol/L    CO2 24 22 - 31 mmol/L    Anion Gap, Calculation 12 5 - 18 mmol/L    Glucose 115 70 - 125 mg/dL    Calcium 9.5 8.5 - 10.5 mg/dL    BUN 15 8 - 22 mg/dL    Creatinine 0.77 0.60 - 1.10 mg/dL    GFR MDRD Af Amer >60 >60 mL/min/1.73m2    GFR MDRD Non Af Amer >60 >60 mL/min/1.73m2    Narrative    Fasting Glucose reference range is 70-99 mg/dL per  American Diabetes Association (ADA) guidelines.   ALT (SGPT)   Result Value Ref Range    ALT 32 0 - 45 U/L   Lipid Cascade   Result Value Ref Range    Cholesterol 170 <=199 mg/dL    Triglycerides 106 <=149 mg/dL    HDL Cholesterol 60 >=50 mg/dL    LDL Calculated 89 <=129 mg/dL    Patient Fasting > 8hrs? Yes        Oly, the results of your recent blood test are normal.  Cholesterol has come down nicely, liver is tolerating medication well.        Please call with questions or contact us using [email or web address].    Sincerely,        Electronically signed by Wale Lima MD

## 2022-02-16 DIAGNOSIS — Z76.0 ENCOUNTER FOR MEDICATION REFILL: Primary | ICD-10-CM

## 2022-02-16 DIAGNOSIS — I10 ESSENTIAL HYPERTENSION: ICD-10-CM

## 2022-02-16 RX ORDER — PROPRANOLOL HYDROCHLORIDE 40 MG/1
40 TABLET ORAL 3 TIMES DAILY
Qty: 90 TABLET | Refills: 5 | Status: SHIPPED | OUTPATIENT
Start: 2022-02-16 | End: 2022-08-25

## 2022-02-16 NOTE — TELEPHONE ENCOUNTER
Medication Question or Refill    Who is calling: [facility] pharmacy    What medication are you calling about (include dose and sig)?: Propranolol 40 mg tab take one tab three times daily    Who prescribed the medication?: PCP    Do you need a refill? Yes: script     When did you use the medication last? unknown    Patient offered an appointment? No but has not been seen since 20.      Do you have any questions or concerns?  No    Okay to leave a detailed message?: No     Call taken on 22 at 335 pm by Sasha Portillo CMA. CMT

## 2022-08-24 DIAGNOSIS — Z76.0 ENCOUNTER FOR MEDICATION REFILL: ICD-10-CM

## 2022-08-24 DIAGNOSIS — I10 ESSENTIAL HYPERTENSION: ICD-10-CM

## 2022-08-25 RX ORDER — PROPRANOLOL HYDROCHLORIDE 40 MG/1
40 TABLET ORAL 3 TIMES DAILY
Qty: 90 TABLET | Refills: 5 | Status: SHIPPED | OUTPATIENT
Start: 2022-08-25 | End: 2023-02-22

## 2022-08-25 RX ORDER — ATORVASTATIN CALCIUM 10 MG/1
TABLET, FILM COATED ORAL
Qty: 90 TABLET | Refills: 3 | Status: SHIPPED | OUTPATIENT
Start: 2022-08-25 | End: 2023-08-28

## 2022-08-25 NOTE — TELEPHONE ENCOUNTER
"Routing refill request to provider for review/approval because:  Drug not on the [facility] refill protocol   Labs not current:  LDL  Patient needs to be seen because it has been more than 1 year since last office visit.  atorvastatin (LIPITOR) 10 MG tablet  Last Written Prescription Date:  8/17/21  Last Fill Quantity: 90,  # refills: 3   propranolol (INDERAL) 40 MG tablet  Last Written Prescription Date:  2/16/22  Last Fill Quantity: 90,  # refills: 5   Last office visit provider:  1/29/2020     Requested Prescriptions   Pending Prescriptions Disp Refills     atorvastatin (LIPITOR) 10 MG tablet 90 tablet 3     Sig: [ATORVASTATIN (LIPITOR) 10 MG TABLET] TAKE ONE TABLET BY MOUTH ONCE DAILY       Statins Protocol Failed - 8/24/2022  8:32 AM        Failed - LDL on file in past 12 months     Recent Labs   Lab Test 01/29/20  0848   LDL 89             Failed - Recent (12 mo) or future (30 days) visit within the authorizing provider's specialty     Patient has had an office visit with the authorizing provider or a provider within the authorizing providers department within the previous 12 mos or has a future within next 30 days. See \"Patient Info\" tab in inbasket, or \"Choose Columns\" in Meds & Orders section of the refill encounter.              Passed - No abnormal creatine kinase in past 12 months     No lab results found.             Passed - Medication is active on med list        Passed - Patient is age 18 or older        Passed - No active pregnancy on record        Passed - No positive pregnancy test in past 12 months           propranolol (INDERAL) 40 MG tablet 90 tablet 5     Sig: Take 1 tablet (40 mg) by mouth 3 times daily       There is no refill protocol information for this order          Noy Morataya RN 08/25/22 2:52 PM  "

## 2022-11-10 NOTE — TELEPHONE ENCOUNTER
"Chief Complaint   Patient presents with    Pelvic Pain     LT sided pelvic pain x1.5 weeks. Denies vaginal bleeding. Radiated up into LT flank. Reports hx of kidney stones and this feels similar.     Flank Pain     Hx hysterectomy. No distress noted. VS stable.     BP (!) 162/96   Pulse 92   Temp 36.9 °C (98.4 °F) (Oral)   Resp 16   Ht 1.676 m (5' 6\")   Wt 108 kg (238 lb)   SpO2 97%   BMI 38.41 kg/m²     " Pt is due for hypertension visit, quality measure.

## 2023-02-21 DIAGNOSIS — I10 ESSENTIAL HYPERTENSION: ICD-10-CM

## 2023-02-21 DIAGNOSIS — Z76.0 ENCOUNTER FOR MEDICATION REFILL: ICD-10-CM

## 2023-02-22 RX ORDER — PROPRANOLOL HYDROCHLORIDE 40 MG/1
40 TABLET ORAL 3 TIMES DAILY
Qty: 90 TABLET | Refills: 0 | Status: SHIPPED | OUTPATIENT
Start: 2023-02-22 | End: 2023-03-29

## 2023-02-22 NOTE — TELEPHONE ENCOUNTER
"Routing refill request to provider for review/approval because:  Patient needs to be seen because it has been more than 1 year since last office visit.  BP out of range    Last Written Prescription Date:  8/25/22  Last Fill Quantity: 90,  # refills: 5   Last office visit provider:  1/29/20     Requested Prescriptions   Pending Prescriptions Disp Refills     propranolol (INDERAL) 40 MG tablet [Pharmacy Med Name: Propranolol HCl Oral Tablet 40 MG] 90 tablet 0     Sig: Take 1 tablet (40 mg) by mouth 3 times daily       Beta-Blockers Protocol Failed - 2/22/2023 10:18 AM        Failed - Blood pressure under 140/90 in past 12 months     BP Readings from Last 3 Encounters:   01/29/20 136/87                 Failed - Recent (12 mo) or future (30 days) visit within the authorizing provider's specialty     Patient has had an office visit with the authorizing provider or a provider within the authorizing providers department within the previous 12 mos or has a future within next 30 days. See \"Patient Info\" tab in inbasket, or \"Choose Columns\" in Meds & Orders section of the refill encounter.              Passed - Patient is age 6 or older        Passed - Medication is active on med list             Christal Oshea RN 02/22/23 10:18 AM  "

## 2023-03-28 DIAGNOSIS — I10 ESSENTIAL HYPERTENSION: ICD-10-CM

## 2023-03-28 DIAGNOSIS — Z76.0 ENCOUNTER FOR MEDICATION REFILL: ICD-10-CM

## 2023-03-28 NOTE — TELEPHONE ENCOUNTER
"Routing refill request to provider for review/approval because:  Patient needs to be seen because it has been more than 1 year since last office visit.    Last Written Prescription Date:  2/22/23  Last Fill Quantity: 90,  # refills: 0   Last office visit provider:  1/29/20, PCP     Requested Prescriptions   Pending Prescriptions Disp Refills     propranolol (INDERAL) 40 MG tablet [Pharmacy Med Name: Propranolol HCl Oral Tablet 40 MG] 90 tablet 0     Sig: Take 1 tablet (40 mg) by mouth 3 times daily       Beta-Blockers Protocol Failed - 3/28/2023  2:00 AM        Failed - Blood pressure under 140/90 in past 12 months     BP Readings from Last 3 Encounters:   01/29/20 136/87                 Failed - Recent (12 mo) or future (30 days) visit within the authorizing provider's specialty     Patient has had an office visit with the authorizing provider or a provider within the authorizing providers department within the previous 12 mos or has a future within next 30 days. See \"Patient Info\" tab in inbasket, or \"Choose Columns\" in Meds & Orders section of the refill encounter.              Passed - Patient is age 6 or older        Passed - Medication is active on med list             Gabrielle Durán RN 03/28/23 4:56 PM  "

## 2023-03-29 RX ORDER — PROPRANOLOL HYDROCHLORIDE 40 MG/1
40 TABLET ORAL 3 TIMES DAILY
Qty: 90 TABLET | Refills: 0 | Status: SHIPPED | OUTPATIENT
Start: 2023-03-29 | End: 2023-05-04

## 2023-05-03 DIAGNOSIS — I10 ESSENTIAL HYPERTENSION: ICD-10-CM

## 2023-05-03 DIAGNOSIS — Z76.0 ENCOUNTER FOR MEDICATION REFILL: ICD-10-CM

## 2023-05-04 RX ORDER — PROPRANOLOL HYDROCHLORIDE 40 MG/1
40 TABLET ORAL 3 TIMES DAILY
Qty: 90 TABLET | Refills: 0 | Status: SHIPPED | OUTPATIENT
Start: 2023-05-04 | End: 2023-05-30

## 2023-05-04 NOTE — TELEPHONE ENCOUNTER
"Routing refill request to provider for review/approval because:  Patient needs to be seen because it has been more than 1 year since last office visit. Blood pressure reading past 12 months. Early refill.     Last Written Prescription Date:  3/29/23  Last Fill Quantity: 90,  # refills: 0  Last office visit provider:  1/29/20    Requested Prescriptions   Pending Prescriptions Disp Refills     propranolol (INDERAL) 40 MG tablet [Pharmacy Med Name: Propranolol HCl Oral Tablet 40 MG] 90 tablet 0     Sig: Take 1 tablet (40 mg) by mouth 3 times daily       Beta-Blockers Protocol Failed - 5/4/2023  3:57 PM        Failed - Blood pressure under 140/90 in past 12 months     BP Readings from Last 3 Encounters:   01/29/20 136/87                 Failed - Recent (12 mo) or future (30 days) visit within the authorizing provider's specialty     Patient has had an office visit with the authorizing provider or a provider within the authorizing providers department within the previous 12 mos or has a future within next 30 days. See \"Patient Info\" tab in inbasket, or \"Choose Columns\" in Meds & Orders section of the refill encounter.              Passed - Patient is age 6 or older        Passed - Medication is active on med list             Pretty Koenig RN 05/04/23 3:58 PM  "

## 2023-05-29 DIAGNOSIS — I10 ESSENTIAL HYPERTENSION: ICD-10-CM

## 2023-05-29 DIAGNOSIS — Z76.0 ENCOUNTER FOR MEDICATION REFILL: ICD-10-CM

## 2023-05-29 NOTE — TELEPHONE ENCOUNTER
"Routing refill request to provider for review/approval because:  Patient needs to be seen because it has been more than 1 year since last office visit.  Blood pressure from 2020    Last Written Prescription Date:  5/4/2023  Last Fill Quantity: 90,  # refills: 0   Last office visit provider:  1/29/2020     Requested Prescriptions   Pending Prescriptions Disp Refills     propranolol (INDERAL) 40 MG tablet [Pharmacy Med Name: Propranolol HCl Oral Tablet 40 MG] 90 tablet 0     Sig: Take 1 tablet (40 mg) by mouth 3 times daily       Beta-Blockers Protocol Failed - 5/29/2023 12:24 PM        Failed - Blood pressure under 140/90 in past 12 months     BP Readings from Last 3 Encounters:   01/29/20 136/87                 Failed - Recent (12 mo) or future (30 days) visit within the authorizing provider's specialty     Patient has had an office visit with the authorizing provider or a provider within the authorizing providers department within the previous 12 mos or has a future within next 30 days. See \"Patient Info\" tab in inbasket, or \"Choose Columns\" in Meds & Orders section of the refill encounter.              Passed - Patient is age 6 or older        Passed - Medication is active on med list             Carrol Mckinley RN 05/29/23 12:29 PM  "

## 2023-05-30 RX ORDER — PROPRANOLOL HYDROCHLORIDE 40 MG/1
40 TABLET ORAL 3 TIMES DAILY
Qty: 90 TABLET | Refills: 0 | Status: SHIPPED | OUTPATIENT
Start: 2023-05-30 | End: 2023-07-03

## 2023-07-01 DIAGNOSIS — I10 ESSENTIAL HYPERTENSION: ICD-10-CM

## 2023-07-01 DIAGNOSIS — Z76.0 ENCOUNTER FOR MEDICATION REFILL: ICD-10-CM

## 2023-07-02 NOTE — TELEPHONE ENCOUNTER
"Routing refill request to provider for review/approval because:  Patient needs to be seen because it has been more than 1 year since last office visit.  BP out of range    Last Written Prescription Date:  5/30/23  Last Fill Quantity: 90,  # refills: 0   Last office visit provider:  1/29/2020     Requested Prescriptions   Pending Prescriptions Disp Refills     propranolol (INDERAL) 40 MG tablet [Pharmacy Med Name: Propranolol HCl Oral Tablet 40 MG] 90 tablet 0     Sig: Take 1 tablet (40 mg) by mouth 3 times daily       Beta-Blockers Protocol Failed - 7/1/2023 11:31 PM        Failed - Blood pressure under 140/90 in past 12 months     BP Readings from Last 3 Encounters:   01/29/20 136/87                 Failed - Recent (12 mo) or future (30 days) visit within the authorizing provider's specialty     Patient has had an office visit with the authorizing provider or a provider within the authorizing providers department within the previous 12 mos or has a future within next 30 days. See \"Patient Info\" tab in inbasket, or \"Choose Columns\" in Meds & Orders section of the refill encounter.              Passed - Patient is age 6 or older        Passed - Medication is active on med list             Aida Villanueva RN 07/01/23 11:31 PM  "

## 2023-07-03 RX ORDER — PROPRANOLOL HYDROCHLORIDE 40 MG/1
40 TABLET ORAL 3 TIMES DAILY
Qty: 90 TABLET | Refills: 0 | Status: SHIPPED | OUTPATIENT
Start: 2023-07-03 | End: 2023-09-06

## 2023-08-28 DIAGNOSIS — I10 ESSENTIAL HYPERTENSION: ICD-10-CM

## 2023-08-28 RX ORDER — ATORVASTATIN CALCIUM 10 MG/1
TABLET, FILM COATED ORAL
Qty: 90 TABLET | Refills: 3 | Status: SHIPPED | OUTPATIENT
Start: 2023-08-28

## 2023-08-28 NOTE — TELEPHONE ENCOUNTER
"Routing refill request to provider for review/approval because:  Labs not current:  LDL  Patient needs to be seen because it has been more than 1 year since last office visit.    Last Written Prescription Date:  8/25/22  Last Fill Quantity: 90,  # refills: 3   Last office visit provider:  1/29/20     Requested Prescriptions   Pending Prescriptions Disp Refills    atorvastatin (LIPITOR) 10 MG tablet [Pharmacy Med Name: Atorvastatin Calcium Oral Tablet 10 MG] 90 tablet 0     Sig: TAKE ONE TABLET BY MOUTH ONCE DAILY       Statins Protocol Failed - 8/28/2023  4:22 AM        Failed - LDL on file in past 12 months     Recent Labs   Lab Test 01/29/20  0848   LDL 89             Failed - Recent (12 mo) or future (30 days) visit within the authorizing provider's specialty     Patient has had an office visit with the authorizing provider or a provider within the authorizing providers department within the previous 12 mos or has a future within next 30 days. See \"Patient Info\" tab in inbasket, or \"Choose Columns\" in Meds & Orders section of the refill encounter.              Passed - No abnormal creatine kinase in past 12 months     No lab results found.             Passed - Medication is active on med list        Passed - Patient is age 18 or older        Passed - No active pregnancy on record        Passed - No positive pregnancy test in past 12 months             Aida Villanueva RN 08/28/23 4:22 AM  "

## 2023-09-01 DIAGNOSIS — I10 ESSENTIAL HYPERTENSION: ICD-10-CM

## 2023-09-01 DIAGNOSIS — Z76.0 ENCOUNTER FOR MEDICATION REFILL: ICD-10-CM

## 2023-09-01 NOTE — TELEPHONE ENCOUNTER
"Routing refill request to provider for review/approval because:  Labs not current:  BP  Patient needs to be seen because it has been more than 1 year since last office visit.    Last Written Prescription Date:  7/3/23  Last Fill Quantity: 90,  # refills: 0   Last office visit provider:  1/29/20     Requested Prescriptions   Pending Prescriptions Disp Refills    propranolol (INDERAL) 40 MG tablet [Pharmacy Med Name: Propranolol HCl Oral Tablet 40 MG] 90 tablet 0     Sig: TAKE ONE TABLET BY MOUTH THREE TIMES DAILY       Beta-Blockers Protocol Failed - 9/1/2023  2:56 PM        Failed - Blood pressure under 140/90 in past 12 months     BP Readings from Last 3 Encounters:   01/29/20 136/87                 Failed - Recent (12 mo) or future (30 days) visit within the authorizing provider's specialty     Patient has had an office visit with the authorizing provider or a provider within the authorizing providers department within the previous 12 mos or has a future within next 30 days. See \"Patient Info\" tab in inbasket, or \"Choose Columns\" in Meds & Orders section of the refill encounter.              Passed - Patient is age 6 or older        Passed - Medication is active on med list             Naomie Starr 09/01/23 3:48 PM  "

## 2023-09-06 RX ORDER — PROPRANOLOL HYDROCHLORIDE 40 MG/1
40 TABLET ORAL 3 TIMES DAILY
Qty: 90 TABLET | Refills: 0 | Status: SHIPPED | OUTPATIENT
Start: 2023-09-06

## 2023-09-30 DIAGNOSIS — Z76.0 ENCOUNTER FOR MEDICATION REFILL: ICD-10-CM

## 2023-09-30 DIAGNOSIS — I10 ESSENTIAL HYPERTENSION: ICD-10-CM

## 2023-10-02 RX ORDER — PROPRANOLOL HYDROCHLORIDE 40 MG/1
40 TABLET ORAL 3 TIMES DAILY
Qty: 90 TABLET | Refills: 0 | OUTPATIENT
Start: 2023-10-02